# Patient Record
Sex: FEMALE | Race: WHITE | NOT HISPANIC OR LATINO | URBAN - METROPOLITAN AREA
[De-identification: names, ages, dates, MRNs, and addresses within clinical notes are randomized per-mention and may not be internally consistent; named-entity substitution may affect disease eponyms.]

---

## 2017-09-07 ENCOUNTER — EMERGENCY (EMERGENCY)
Facility: HOSPITAL | Age: 67
LOS: 1 days | Discharge: HOME | End: 2017-09-07
Attending: EMERGENCY MEDICINE | Admitting: EMERGENCY MEDICINE
Payer: OTHER GOVERNMENT

## 2017-09-07 VITALS
OXYGEN SATURATION: 99 % | RESPIRATION RATE: 18 BRPM | SYSTOLIC BLOOD PRESSURE: 160 MMHG | HEART RATE: 76 BPM | DIASTOLIC BLOOD PRESSURE: 82 MMHG

## 2017-09-07 VITALS
SYSTOLIC BLOOD PRESSURE: 145 MMHG | RESPIRATION RATE: 18 BRPM | HEART RATE: 74 BPM | OXYGEN SATURATION: 100 % | DIASTOLIC BLOOD PRESSURE: 85 MMHG | TEMPERATURE: 98 F

## 2017-09-07 LAB
ALBUMIN SERPL ELPH-MCNC: 4.6 G/DL — SIGNIFICANT CHANGE UP (ref 3.3–5)
ALBUMIN SERPL ELPH-MCNC: 4.6 G/DL — SIGNIFICANT CHANGE UP (ref 3.3–5)
ALP SERPL-CCNC: 141 U/L — HIGH (ref 40–120)
ALP SERPL-CCNC: 141 U/L — HIGH (ref 40–120)
ALT FLD-CCNC: 105 U/L — HIGH (ref 4–33)
ALT FLD-CCNC: 105 U/L — HIGH (ref 4–33)
APPEARANCE UR: CLEAR — SIGNIFICANT CHANGE UP
AST SERPL-CCNC: 137 U/L — HIGH (ref 4–32)
AST SERPL-CCNC: 137 U/L — HIGH (ref 4–32)
BASE EXCESS BLDV CALC-SCNC: 4 MMOL/L — SIGNIFICANT CHANGE UP
BASOPHILS # BLD AUTO: 0.05 K/UL — SIGNIFICANT CHANGE UP (ref 0–0.2)
BASOPHILS NFR BLD AUTO: 0.4 % — SIGNIFICANT CHANGE UP (ref 0–2)
BILIRUB DIRECT SERPL-MCNC: 0.3 MG/DL — HIGH (ref 0.1–0.2)
BILIRUB SERPL-MCNC: 0.5 MG/DL — SIGNIFICANT CHANGE UP (ref 0.2–1.2)
BILIRUB SERPL-MCNC: 0.5 MG/DL — SIGNIFICANT CHANGE UP (ref 0.2–1.2)
BILIRUB UR-MCNC: NEGATIVE — SIGNIFICANT CHANGE UP
BLOOD GAS VENOUS - CREATININE: 0.97 MG/DL — SIGNIFICANT CHANGE UP (ref 0.5–1.3)
BLOOD UR QL VISUAL: NEGATIVE — SIGNIFICANT CHANGE UP
BUN SERPL-MCNC: 19 MG/DL — SIGNIFICANT CHANGE UP (ref 7–23)
CALCIUM SERPL-MCNC: 11.2 MG/DL — HIGH (ref 8.4–10.5)
CHLORIDE BLDV-SCNC: 101 MMOL/L — SIGNIFICANT CHANGE UP (ref 96–108)
CHLORIDE SERPL-SCNC: 97 MMOL/L — LOW (ref 98–107)
CO2 SERPL-SCNC: 29 MMOL/L — SIGNIFICANT CHANGE UP (ref 22–31)
COLOR SPEC: YELLOW — SIGNIFICANT CHANGE UP
CREAT SERPL-MCNC: 1.11 MG/DL — SIGNIFICANT CHANGE UP (ref 0.5–1.3)
EOSINOPHIL # BLD AUTO: 0.01 K/UL — SIGNIFICANT CHANGE UP (ref 0–0.5)
EOSINOPHIL NFR BLD AUTO: 0.1 % — SIGNIFICANT CHANGE UP (ref 0–6)
GAS PNL BLDV: 140 MMOL/L — SIGNIFICANT CHANGE UP (ref 136–146)
GLUCOSE BLDV-MCNC: 134 — HIGH (ref 70–99)
GLUCOSE SERPL-MCNC: 133 MG/DL — HIGH (ref 70–99)
GLUCOSE UR-MCNC: NEGATIVE — SIGNIFICANT CHANGE UP
HCO3 BLDV-SCNC: 25 MMOL/L — SIGNIFICANT CHANGE UP (ref 20–27)
HCT VFR BLD CALC: 44.3 % — SIGNIFICANT CHANGE UP (ref 34.5–45)
HCT VFR BLDV CALC: 47.1 % — HIGH (ref 34.5–45)
HGB BLD-MCNC: 15 G/DL — SIGNIFICANT CHANGE UP (ref 11.5–15.5)
HGB BLDV-MCNC: 15.4 G/DL — SIGNIFICANT CHANGE UP (ref 11.5–15.5)
HYALINE CASTS # UR AUTO: SIGNIFICANT CHANGE UP (ref 0–?)
IMM GRANULOCYTES # BLD AUTO: 0.07 # — SIGNIFICANT CHANGE UP
IMM GRANULOCYTES NFR BLD AUTO: 0.6 % — SIGNIFICANT CHANGE UP (ref 0–1.5)
KETONES UR-MCNC: NEGATIVE — SIGNIFICANT CHANGE UP
LACTATE BLDV-MCNC: 3.4 MMOL/L — HIGH (ref 0.5–2)
LEUKOCYTE ESTERASE UR-ACNC: NEGATIVE — SIGNIFICANT CHANGE UP
LIDOCAIN IGE QN: 25.2 U/L — SIGNIFICANT CHANGE UP (ref 7–60)
LYMPHOCYTES # BLD AUTO: 0.87 K/UL — LOW (ref 1–3.3)
LYMPHOCYTES # BLD AUTO: 7.4 % — LOW (ref 13–44)
MCHC RBC-ENTMCNC: 32.1 PG — SIGNIFICANT CHANGE UP (ref 27–34)
MCHC RBC-ENTMCNC: 33.9 % — SIGNIFICANT CHANGE UP (ref 32–36)
MCV RBC AUTO: 94.7 FL — SIGNIFICANT CHANGE UP (ref 80–100)
MONOCYTES # BLD AUTO: 0.59 K/UL — SIGNIFICANT CHANGE UP (ref 0–0.9)
MONOCYTES NFR BLD AUTO: 5 % — SIGNIFICANT CHANGE UP (ref 2–14)
MUCOUS THREADS # UR AUTO: SIGNIFICANT CHANGE UP
NEUTROPHILS # BLD AUTO: 10.13 K/UL — HIGH (ref 1.8–7.4)
NEUTROPHILS NFR BLD AUTO: 86.5 % — HIGH (ref 43–77)
NITRITE UR-MCNC: NEGATIVE — SIGNIFICANT CHANGE UP
NRBC # FLD: 0 — SIGNIFICANT CHANGE UP
PCO2 BLDV: 54 MMHG — HIGH (ref 41–51)
PH BLDV: 7.36 PH — SIGNIFICANT CHANGE UP (ref 7.32–7.43)
PH UR: 7 — SIGNIFICANT CHANGE UP (ref 4.6–8)
PLATELET # BLD AUTO: 184 K/UL — SIGNIFICANT CHANGE UP (ref 150–400)
PMV BLD: 11.9 FL — SIGNIFICANT CHANGE UP (ref 7–13)
PO2 BLDV: < 24 MMHG — LOW (ref 35–40)
POTASSIUM BLDV-SCNC: 3.6 MMOL/L — SIGNIFICANT CHANGE UP (ref 3.4–4.5)
POTASSIUM SERPL-MCNC: 3.8 MMOL/L — SIGNIFICANT CHANGE UP (ref 3.5–5.3)
POTASSIUM SERPL-SCNC: 3.8 MMOL/L — SIGNIFICANT CHANGE UP (ref 3.5–5.3)
PROT SERPL-MCNC: 8.5 G/DL — HIGH (ref 6–8.3)
PROT SERPL-MCNC: 8.5 G/DL — HIGH (ref 6–8.3)
PROT UR-MCNC: 10 — SIGNIFICANT CHANGE UP
RBC # BLD: 4.68 M/UL — SIGNIFICANT CHANGE UP (ref 3.8–5.2)
RBC # FLD: 14.4 % — SIGNIFICANT CHANGE UP (ref 10.3–14.5)
RBC CASTS # UR COMP ASSIST: SIGNIFICANT CHANGE UP (ref 0–?)
SAO2 % BLDV: 23.7 % — LOW (ref 60–85)
SODIUM SERPL-SCNC: 142 MMOL/L — SIGNIFICANT CHANGE UP (ref 135–145)
SP GR SPEC: 1.02 — SIGNIFICANT CHANGE UP (ref 1–1.03)
SQUAMOUS # UR AUTO: SIGNIFICANT CHANGE UP
UROBILINOGEN FLD QL: NORMAL E.U. — SIGNIFICANT CHANGE UP (ref 0.1–0.2)
WBC # BLD: 11.72 K/UL — HIGH (ref 3.8–10.5)
WBC # FLD AUTO: 11.72 K/UL — HIGH (ref 3.8–10.5)
WBC UR QL: SIGNIFICANT CHANGE UP (ref 0–?)

## 2017-09-07 PROCEDURE — 99284 EMERGENCY DEPT VISIT MOD MDM: CPT | Mod: GC

## 2017-09-07 RX ORDER — SODIUM CHLORIDE 9 MG/ML
1000 INJECTION INTRAMUSCULAR; INTRAVENOUS; SUBCUTANEOUS ONCE
Qty: 0 | Refills: 0 | Status: COMPLETED | OUTPATIENT
Start: 2017-09-07 | End: 2017-09-07

## 2017-09-07 RX ORDER — ONDANSETRON 8 MG/1
4 TABLET, FILM COATED ORAL ONCE
Qty: 0 | Refills: 0 | Status: COMPLETED | OUTPATIENT
Start: 2017-09-07 | End: 2017-09-07

## 2017-09-07 RX ORDER — ACETAMINOPHEN 500 MG
1000 TABLET ORAL ONCE
Qty: 0 | Refills: 0 | Status: COMPLETED | OUTPATIENT
Start: 2017-09-07 | End: 2017-09-07

## 2017-09-07 RX ADMIN — ONDANSETRON 4 MILLIGRAM(S): 8 TABLET, FILM COATED ORAL at 13:02

## 2017-09-07 RX ADMIN — SODIUM CHLORIDE 1000 MILLILITER(S): 9 INJECTION INTRAMUSCULAR; INTRAVENOUS; SUBCUTANEOUS at 13:02

## 2017-09-07 RX ADMIN — Medication 1000 MILLIGRAM(S): at 13:15

## 2017-09-07 RX ADMIN — Medication 400 MILLIGRAM(S): at 13:03

## 2017-09-07 NOTE — ED ADULT TRIAGE NOTE - CHIEF COMPLAINT QUOTE
c/o of transverse abdominal pain with nausea. Hx known gallstones.  Denies vomiting/diarrhea.  Denies fever/chills.  Denies chest pain/SOB/lightheadedness.

## 2017-09-07 NOTE — ED PROVIDER NOTE - ATTENDING CONTRIBUTION TO CARE
I performed a face-to-face evaluation of the patient and performed a history and physical examination. I agree with the history and physical examination.    Marlin: 66 F, HTN, HL, known cholelithasis, chronic transaminitis 2/2 ETOH use, p/w upper abd pain that awoke her 2 hrs PTA. W/ nausea. Decreased pain now. Appears well. NAD. Afebrile. Vital signs unremarkable. Appears well. NAD. Afebrile. Vital signs unremarkable. Abd NT/ND. No jaundice. Likely biliary colic. PlanL lipase, LFTs. If unremarkable, Discharge home.

## 2017-09-07 NOTE — ED PROVIDER NOTE - OBJECTIVE STATEMENT
65 y/o F with h/o HTN, hypothyroid presents with complaint of upper abdominal pain. Patient awoke 2 hours ago with 8/10 upper abdominal pain and nausea. No vomiting. Pain now subsided and described as 3/10 associated with mild nausea. Patient had similar episode 3 months ago and was found to have gallstones. Is being followed by Dr. Sargent (Surgeon). Patient reports daily alcohol use-2 glasses of bourbon. No history of alcohol withdrawal.

## 2017-09-07 NOTE — ED PROVIDER NOTE - PROGRESS NOTE DETAILS
There is mild elevation of AST/ ALT consistent with patient's chronic alcohol use. Patient's abdominal exam is benign. No complaint of pain at this time. Tolerating PO.

## 2017-09-07 NOTE — ED ADULT NURSE NOTE - OBJECTIVE STATEMENT
pt c/o upper quad abd pain started this AM. Hx gallstones. changed into gown, obtained urine, iv start 20g left ac with bloods drawn. NPO since last night.

## 2017-09-07 NOTE — ED PROVIDER NOTE - MEDICAL DECISION MAKING DETAILS
Marlin: 66 F, HTN, HL, known cholelithasis, p/w upper abd pain that awoke her 2 hrs PTA. W/ nausea. Decreased pain now. Appears well. NAD. Afebrile. Vital signs unremarkable. Appears well. NAD. Afebrile. Vital signs unremarkable. Abd NT/ND. No jaundice. Likely biliary colic. PlanL lipase, LFTs. If neg, Discharge home.

## 2017-09-08 LAB
BACTERIA UR CULT: SIGNIFICANT CHANGE UP
SPECIMEN SOURCE: SIGNIFICANT CHANGE UP

## 2017-09-09 ENCOUNTER — INPATIENT (INPATIENT)
Facility: HOSPITAL | Age: 67
LOS: 0 days | Discharge: HOME | End: 2017-09-10
Attending: SURGERY | Admitting: SURGERY
Payer: OTHER GOVERNMENT

## 2017-09-09 ENCOUNTER — RESULT REVIEW (OUTPATIENT)
Age: 67
End: 2017-09-09

## 2017-09-09 VITALS
HEART RATE: 88 BPM | RESPIRATION RATE: 16 BRPM | OXYGEN SATURATION: 96 % | DIASTOLIC BLOOD PRESSURE: 76 MMHG | SYSTOLIC BLOOD PRESSURE: 135 MMHG | HEIGHT: 64 IN | TEMPERATURE: 98 F | WEIGHT: 177.03 LBS

## 2017-09-09 DIAGNOSIS — Z98.890 OTHER SPECIFIED POSTPROCEDURAL STATES: Chronic | ICD-10-CM

## 2017-09-09 DIAGNOSIS — K81.9 CHOLECYSTITIS, UNSPECIFIED: ICD-10-CM

## 2017-09-09 LAB
BLD GP AB SCN SERPL QL: NEGATIVE — SIGNIFICANT CHANGE UP
RH IG SCN BLD-IMP: POSITIVE — SIGNIFICANT CHANGE UP

## 2017-09-09 PROCEDURE — 76705 ECHO EXAM OF ABDOMEN: CPT | Mod: 26

## 2017-09-09 PROCEDURE — 88304 TISSUE EXAM BY PATHOLOGIST: CPT | Mod: 26

## 2017-09-09 PROCEDURE — 99223 1ST HOSP IP/OBS HIGH 75: CPT | Mod: 25,AI,GC

## 2017-09-09 PROCEDURE — 47562 LAPAROSCOPIC CHOLECYSTECTOMY: CPT | Mod: GC

## 2017-09-09 RX ORDER — PIPERACILLIN AND TAZOBACTAM 4; .5 G/20ML; G/20ML
3.38 INJECTION, POWDER, LYOPHILIZED, FOR SOLUTION INTRAVENOUS EVERY 8 HOURS
Qty: 0 | Refills: 0 | Status: DISCONTINUED | OUTPATIENT
Start: 2017-09-09 | End: 2017-09-10

## 2017-09-09 RX ORDER — ACETAMINOPHEN 500 MG
1000 TABLET ORAL ONCE
Qty: 0 | Refills: 0 | Status: COMPLETED | OUTPATIENT
Start: 2017-09-09 | End: 2017-09-09

## 2017-09-09 RX ORDER — LEVOTHYROXINE SODIUM 125 MCG
75 TABLET ORAL DAILY
Qty: 0 | Refills: 0 | Status: DISCONTINUED | OUTPATIENT
Start: 2017-09-09 | End: 2017-09-10

## 2017-09-09 RX ORDER — PIPERACILLIN AND TAZOBACTAM 4; .5 G/20ML; G/20ML
3.38 INJECTION, POWDER, LYOPHILIZED, FOR SOLUTION INTRAVENOUS ONCE
Qty: 0 | Refills: 0 | Status: COMPLETED | OUTPATIENT
Start: 2017-09-09 | End: 2017-09-09

## 2017-09-09 RX ORDER — HEPARIN SODIUM 5000 [USP'U]/ML
5000 INJECTION INTRAVENOUS; SUBCUTANEOUS EVERY 8 HOURS
Qty: 0 | Refills: 0 | Status: DISCONTINUED | OUTPATIENT
Start: 2017-09-09 | End: 2017-09-10

## 2017-09-09 RX ORDER — SODIUM CHLORIDE 9 MG/ML
1000 INJECTION, SOLUTION INTRAVENOUS
Qty: 0 | Refills: 0 | Status: DISCONTINUED | OUTPATIENT
Start: 2017-09-09 | End: 2017-09-10

## 2017-09-09 RX ADMIN — PIPERACILLIN AND TAZOBACTAM 200 GRAM(S): 4; .5 INJECTION, POWDER, LYOPHILIZED, FOR SOLUTION INTRAVENOUS at 14:29

## 2017-09-09 RX ADMIN — SODIUM CHLORIDE 100 MILLILITER(S): 9 INJECTION, SOLUTION INTRAVENOUS at 23:23

## 2017-09-09 RX ADMIN — SODIUM CHLORIDE 100 MILLILITER(S): 9 INJECTION, SOLUTION INTRAVENOUS at 21:12

## 2017-09-09 RX ADMIN — Medication 400 MILLIGRAM(S): at 17:45

## 2017-09-09 RX ADMIN — PIPERACILLIN AND TAZOBACTAM 25 GRAM(S): 4; .5 INJECTION, POWDER, LYOPHILIZED, FOR SOLUTION INTRAVENOUS at 23:22

## 2017-09-09 RX ADMIN — Medication 1000 MILLIGRAM(S): at 17:50

## 2017-09-09 RX ADMIN — SODIUM CHLORIDE 100 MILLILITER(S): 9 INJECTION, SOLUTION INTRAVENOUS at 17:40

## 2017-09-09 RX ADMIN — HEPARIN SODIUM 5000 UNIT(S): 5000 INJECTION INTRAVENOUS; SUBCUTANEOUS at 23:21

## 2017-09-09 NOTE — H&P ADULT - PMH
Atrial septal defect  s/p repair 2010  Hypertension    Hypothyroid    TIA (transient ischemic attack)  January 2017

## 2017-09-09 NOTE — H&P ADULT - NSHPLABSRESULTS_GEN_ALL_CORE
CBC (09-09 @ 07:05)                              15.2                           22.90<H>  )----------------(  171        89.5<H>% Neutrophils, 3.6<L>% Lymphocytes, ANC: 20.52<H>                              44.5      BMP (09-09 @ 07:05)             134<L>  |  92<L>   |  17    		Ca++ --      Ca 10.2               ---------------------------------( 155<H>		Mg --                 4.8     |  23      |  0.97  			Ph --        LFTs (09-09 @ 07:05)      TPro 8.7<H> / Alb 4.2 / TBili 1.0 / DBili -- / AST 85<H> / <H> / AlkPhos 128<H>      VBG (09-09 @ 10:35)     7.44<H> / 37<L> / 61<H> / 25 / 0.8 / 90.4<H>%     Lactate: 1.5      < from: US Abdomen Limited (09.09.17 @ 09:45) >    IMPRESSION:     Cholelithiasis with focal areas of gallbladder wall thickening with an   unreliable sonographic Jo's sign. If clinical suspicion persists for   cholecystitis, HIDA scan can be obtained.      < end of copied text >

## 2017-09-09 NOTE — H&P ADULT - ATTENDING COMMENTS
Pt seen and examined.  Agree with resident eval and plan.  OR for laparoscopic cholecystectomy possible open.

## 2017-09-09 NOTE — H&P ADULT - ASSESSMENT
ASSESSMENT: Patient is a 66y old f with symptomatic cholelithiasis, acute cholecystitis.    PLAN:   - admit to Surgery, Dr. Smith  - NPO  - IV fluids  - IV antibiotics  - Patient consented and added on for OR today for lap carl  - Patient and plan discussed with Attending, Dr. Luis Harry MD PGY3  B Team Surgery, #[id number]

## 2017-09-09 NOTE — ED PROVIDER NOTE - ATTENDING CONTRIBUTION TO CARE
I , Cammy Oliver M.D. have examined the patient and confirmed the essential components of the history, physical examination, diagnosis, and treatment plan. I agree with the patient's care as documented by the PA and amended herein by me. See note above for complete details of service.    67 yo F multiple med Hx inc. ASD s/p repair, TIA, HTN, Hypothyroidism, recently seen on 9/7 for abdominal pain who returns with continued epigastric pain associated with nausea and vomiting. Exam reveals RUQ ttp. US + cholelithiasis with focal areas of GBW thickening. Surg cs placed; accepted pt for admission for surgical mgmt.

## 2017-09-09 NOTE — H&P ADULT - NSHPPHYSICALEXAM_GEN_ALL_CORE
General: NAD, Lying in bed comfortably  Neuro: A+Ox3, no focal deficits  Cardio: RRR, nml S1/S2  Resp: Good effort, CTA b/l  GI/Abd: Soft, mildly distended, tender in RUQ with radiation to LUQ, no rebound/guarding, no masses palpated   Vascular: All 4 extremities warm  Skin: Intact, no breakdown  Musculoskeletal: All 4 extremities moving spontaneously, no limitations.

## 2017-09-09 NOTE — BRIEF OPERATIVE NOTE - OPERATION/FINDINGS
Gangrenous gallbladder, not perforated.  Taken from top down approach. ligated with endo-loops x2.  CHARY drain left in fossa

## 2017-09-09 NOTE — H&P ADULT - NSHPSOCIALHISTORY_GEN_ALL_CORE
Denies current tobacco use, former social smoker.  Current daily alcohol use, about 2 drinks per night, no prior episodes of alcohol withdrawal.

## 2017-09-09 NOTE — H&P ADULT - HISTORY OF PRESENT ILLNESS
Patient is a 66y old  Female who presents with a chief complaint of abdominal pain, nausea, and emesis for the past 4-5 days.  Patient has a history of cholelithiasis, and had one episode of biliary colic in June.  She was found to have gallstones, but never was referred for surgical intervention.  About 4-5 days ago, she started having worsening abdominal pain, with decreased appetite.  She presented to the ED on 9/7, but after IV hydration felt improved and was tolerating PO, and was discharged home.  At that time workup included labs, significant for leukocytosis.  No further workup was performed.  Yesterday, she had worsening of abdominal pain, with nausea and 2 episodes of emesis.  She felt febrile, and had temperatures of up to 101 at home.  She has not had a bowel movement in several days.      PMH significant for h/o ASD and a h/o TIA, she is on ASA 81 daily.  Home medications are: Atorvastatin 20, ASA 81, HCTZ/Triemterene 75/50 0.5 tabs qd, Levothyroxine 75 qd, and Diltiazem 240 qd.

## 2017-09-09 NOTE — ED PROVIDER NOTE - MEDICAL DECISION MAKING DETAILS
pt c/ hx cholelithiasis pw upper abdominal pain and vomiting. symptom control, labs, UA. US ro biliary karissa.

## 2017-09-10 ENCOUNTER — TRANSCRIPTION ENCOUNTER (OUTPATIENT)
Age: 67
End: 2017-09-10

## 2017-09-10 VITALS
SYSTOLIC BLOOD PRESSURE: 99 MMHG | OXYGEN SATURATION: 99 % | HEART RATE: 62 BPM | TEMPERATURE: 98 F | DIASTOLIC BLOOD PRESSURE: 69 MMHG | RESPIRATION RATE: 18 BRPM

## 2017-09-10 LAB
ALBUMIN SERPL ELPH-MCNC: 3.4 G/DL — SIGNIFICANT CHANGE UP (ref 3.3–5)
ALP SERPL-CCNC: 97 U/L — SIGNIFICANT CHANGE UP (ref 40–120)
ALT FLD-CCNC: 88 U/L — HIGH (ref 4–33)
AST SERPL-CCNC: 47 U/L — HIGH (ref 4–32)
BILIRUB DIRECT SERPL-MCNC: 0.3 MG/DL — HIGH (ref 0.1–0.2)
BILIRUB SERPL-MCNC: 0.8 MG/DL — SIGNIFICANT CHANGE UP (ref 0.2–1.2)
BUN SERPL-MCNC: 15 MG/DL — SIGNIFICANT CHANGE UP (ref 7–23)
CALCIUM SERPL-MCNC: 8.8 MG/DL — SIGNIFICANT CHANGE UP (ref 8.4–10.5)
CHLORIDE SERPL-SCNC: 98 MMOL/L — SIGNIFICANT CHANGE UP (ref 98–107)
CO2 SERPL-SCNC: 24 MMOL/L — SIGNIFICANT CHANGE UP (ref 22–31)
CREAT SERPL-MCNC: 1.08 MG/DL — SIGNIFICANT CHANGE UP (ref 0.5–1.3)
GLUCOSE SERPL-MCNC: 115 MG/DL — HIGH (ref 70–99)
HCT VFR BLD CALC: 37 % — SIGNIFICANT CHANGE UP (ref 34.5–45)
HGB BLD-MCNC: 12.4 G/DL — SIGNIFICANT CHANGE UP (ref 11.5–15.5)
MAGNESIUM SERPL-MCNC: 1.7 MG/DL — SIGNIFICANT CHANGE UP (ref 1.6–2.6)
MCHC RBC-ENTMCNC: 31.6 PG — SIGNIFICANT CHANGE UP (ref 27–34)
MCHC RBC-ENTMCNC: 33.5 % — SIGNIFICANT CHANGE UP (ref 32–36)
MCV RBC AUTO: 94.4 FL — SIGNIFICANT CHANGE UP (ref 80–100)
NRBC # FLD: 0 — SIGNIFICANT CHANGE UP
PHOSPHATE SERPL-MCNC: 2.7 MG/DL — SIGNIFICANT CHANGE UP (ref 2.5–4.5)
PLATELET # BLD AUTO: 147 K/UL — LOW (ref 150–400)
PMV BLD: 12.7 FL — SIGNIFICANT CHANGE UP (ref 7–13)
POTASSIUM SERPL-MCNC: 3.9 MMOL/L — SIGNIFICANT CHANGE UP (ref 3.5–5.3)
POTASSIUM SERPL-SCNC: 3.9 MMOL/L — SIGNIFICANT CHANGE UP (ref 3.5–5.3)
PROT SERPL-MCNC: 6.9 G/DL — SIGNIFICANT CHANGE UP (ref 6–8.3)
RBC # BLD: 3.92 M/UL — SIGNIFICANT CHANGE UP (ref 3.8–5.2)
RBC # FLD: 14.5 % — SIGNIFICANT CHANGE UP (ref 10.3–14.5)
SODIUM SERPL-SCNC: 137 MMOL/L — SIGNIFICANT CHANGE UP (ref 135–145)
WBC # BLD: 19.2 K/UL — HIGH (ref 3.8–10.5)
WBC # FLD AUTO: 19.2 K/UL — HIGH (ref 3.8–10.5)

## 2017-09-10 RX ORDER — LEVOTHYROXINE SODIUM 125 MCG
1 TABLET ORAL
Qty: 0 | Refills: 0 | COMMUNITY
Start: 2017-09-10

## 2017-09-10 RX ORDER — SODIUM CHLORIDE 9 MG/ML
3 INJECTION INTRAMUSCULAR; INTRAVENOUS; SUBCUTANEOUS EVERY 8 HOURS
Qty: 0 | Refills: 0 | Status: DISCONTINUED | OUTPATIENT
Start: 2017-09-10 | End: 2017-09-10

## 2017-09-10 RX ORDER — ACETAMINOPHEN 500 MG
650 TABLET ORAL EVERY 6 HOURS
Qty: 0 | Refills: 0 | Status: DISCONTINUED | OUTPATIENT
Start: 2017-09-10 | End: 2017-09-10

## 2017-09-10 RX ORDER — DOCUSATE SODIUM 100 MG
1 CAPSULE ORAL
Qty: 0 | Refills: 0 | COMMUNITY

## 2017-09-10 RX ORDER — OXYCODONE AND ACETAMINOPHEN 5; 325 MG/1; MG/1
2 TABLET ORAL EVERY 4 HOURS
Qty: 0 | Refills: 0 | Status: DISCONTINUED | OUTPATIENT
Start: 2017-09-10 | End: 2017-09-10

## 2017-09-10 RX ORDER — DILTIAZEM HCL 120 MG
1 CAPSULE, EXT RELEASE 24 HR ORAL
Qty: 0 | Refills: 0 | COMMUNITY
Start: 2017-09-10

## 2017-09-10 RX ORDER — OXYCODONE AND ACETAMINOPHEN 5; 325 MG/1; MG/1
1 TABLET ORAL EVERY 4 HOURS
Qty: 0 | Refills: 0 | Status: DISCONTINUED | OUTPATIENT
Start: 2017-09-10 | End: 2017-09-10

## 2017-09-10 RX ORDER — ACETAMINOPHEN 500 MG
2 TABLET ORAL
Qty: 0 | Refills: 0 | COMMUNITY
Start: 2017-09-10

## 2017-09-10 RX ADMIN — PIPERACILLIN AND TAZOBACTAM 25 GRAM(S): 4; .5 INJECTION, POWDER, LYOPHILIZED, FOR SOLUTION INTRAVENOUS at 05:55

## 2017-09-10 RX ADMIN — SODIUM CHLORIDE 3 MILLILITER(S): 9 INJECTION INTRAMUSCULAR; INTRAVENOUS; SUBCUTANEOUS at 13:07

## 2017-09-10 RX ADMIN — HEPARIN SODIUM 5000 UNIT(S): 5000 INJECTION INTRAVENOUS; SUBCUTANEOUS at 05:55

## 2017-09-10 RX ADMIN — HEPARIN SODIUM 5000 UNIT(S): 5000 INJECTION INTRAVENOUS; SUBCUTANEOUS at 15:15

## 2017-09-10 RX ADMIN — OXYCODONE AND ACETAMINOPHEN 1 TABLET(S): 5; 325 TABLET ORAL at 13:05

## 2017-09-10 RX ADMIN — Medication 75 MICROGRAM(S): at 05:56

## 2017-09-10 RX ADMIN — SODIUM CHLORIDE 100 MILLILITER(S): 9 INJECTION, SOLUTION INTRAVENOUS at 11:05

## 2017-09-10 RX ADMIN — Medication 1 TABLET(S): at 15:15

## 2017-09-10 RX ADMIN — OXYCODONE AND ACETAMINOPHEN 1 TABLET(S): 5; 325 TABLET ORAL at 13:30

## 2017-09-10 NOTE — PROVIDER CONTACT NOTE (OTHER) - SITUATION
Patient is s/p lap carl on 9/9. During change of shift RN was told that patient has not voided since OR and as of yet patient has not voided.

## 2017-09-10 NOTE — DISCHARGE NOTE ADULT - CARE PLAN
Principal Discharge DX:	Cholecystitis  Goal:	continued recovery from surgery  Instructions for follow-up, activity and diet:	The patient may resume a regular diet.  Resume regular activities, refrain from lifting more than 45 lbs for 4-6 weeks.    Please follow up with Dr. Smith within 1-2 weeks after discharge from the hospital. You may call [phone] to schedule an appointment.    Follow-up with your primary care doctor as well.

## 2017-09-10 NOTE — DISCHARGE NOTE ADULT - PLAN OF CARE
continued recovery from surgery The patient may resume a regular diet.  Resume regular activities, refrain from lifting more than 45 lbs for 4-6 weeks.    Please follow up with Dr. Smith within 1-2 weeks after discharge from the hospital. You may call [phone] to schedule an appointment.    Follow-up with your primary care doctor as well.

## 2017-09-10 NOTE — PROGRESS NOTE ADULT - SUBJECTIVE AND OBJECTIVE BOX
S: pt seen and examined this morning on rounds. Doing well, pain is controlled with oral medications, and she is tolerating a regular diet.  O: ICU Vital Signs Last 24 Hrs  T(C): 36.8 (10 Sep 2017 10:13), Max: 37.7 (09 Sep 2017 21:24)  T(F): 98.3 (10 Sep 2017 10:13), Max: 99.8 (09 Sep 2017 21:24)  HR: 67 (10 Sep 2017 10:13) (59 - 88)  BP: 96/60 (10 Sep 2017 10:13) (92/100 - 135/76)  BP(mean): --  ABP: --  ABP(mean): --  RR: 17 (10 Sep 2017 10:13) (16 - 18)  SpO2: 96% (10 Sep 2017 10:13) (96% - 100%)    MEDICATIONS  (STANDING):  heparin  Injectable 5000 Unit(s) SubCutaneous every 8 hours  levothyroxine 75 MICROGram(s) Oral daily  diltiazem    milliGRAM(s) Oral daily  amoxicillin  500 milliGRAM(s)/clavulanate 1 Tablet(s) Oral every 8 hours  sodium chloride 0.9% lock flush 3 milliLiter(s) IV Push every 8 hours    MEDICATIONS  (PRN):  oxyCODONE    5 mG/acetaminophen 325 mG 1 Tablet(s) Oral every 4 hours PRN Moderate Pain (4 - 6)  oxyCODONE    5 mG/acetaminophen 325 mG 2 Tablet(s) Oral every 4 hours PRN Severe Pain (7 - 10)  acetaminophen   Tablet. 650 milliGRAM(s) Oral every 6 hours PRN Mild Pain (1 - 3)    LAB:.  LABS:                         12.4   19.20 )-----------( 147      ( 10 Sep 2017 06:00 )             37.0     09-10    137  |  98  |  15  ----------------------------<  115<H>  3.9   |  24  |  1.08    Ca    8.8      10 Sep 2017 06:00  Phos  2.7     09-10  Mg     1.7     09-10    TPro  6.9  /  Alb  3.4  /  TBili  0.8  /  DBili  0.3<H>  /  AST  47<H>  /  ALT  88<H>  /  AlkPhos  97  09-10              RADIOLOGY, EKG & ADDITIONAL TESTS: Reviewed.

## 2017-09-10 NOTE — DISCHARGE NOTE ADULT - CARE PROVIDER_API CALL
Justen Smith (MD), Surgery  [address] [address] [place] [address]  [place] [address]  Phone: [phone]  Fax: [phone]

## 2017-09-10 NOTE — PROVIDER CONTACT NOTE (OTHER) - ACTION/TREATMENT ORDERED:
Patient requested to have her sometime because she feels the urge to and has not drank much liquid during the evening. Will continue to monitor.

## 2017-09-10 NOTE — DISCHARGE NOTE ADULT - HOSPITAL COURSE
Prior to admission: Patient is a 66y old  Female who presents with a chief complaint of abdominal pain, nausea, and emesis for the past 4-5 days.  Patient has a history of cholelithiasis, and had one episode of biliary colic in June.  She was found to have gallstones, but never was referred for surgical intervention.  About 4-5 days ago, she started having worsening abdominal pain, with decreased appetite.  She presented to the ED on 9/7, but after IV hydration felt improved and was tolerating PO, and was discharged home.  At that time workup included labs, significant for leukocytosis.  No further workup was performed.  Yesterday, she had worsening of abdominal pain, with nausea and 2 episodes of emesis.  She felt febrile, and had temperatures of up to 101 at home.  She has not had a bowel movement in several days.      PMH significant for h/o ASD and a h/o TIA, she is on ASA 81 daily.  Home medications are: Atorvastatin 20, ASA 81, HCTZ/Triemterene 75/50 0.5 tabs qd, Levothyroxine 75 qd, and Diltiazem 240 qd.      She was found to have cholecystitis and was taken for a laparoscopic cholecystectomy. On POD#0 she tolerated a regular diet, and pain was controlled with oral medications. She continued to do well and on POD#0 she was transitioned to oral antibiotics, and provided with CHARY drain teaching. At this point the team and the patient felt comfortable with a discharge to home with follow up with Dr. Smith scheduled for Tuesday September 19th

## 2017-09-10 NOTE — PROGRESS NOTE ADULT - SUBJECTIVE AND OBJECTIVE BOX
STATUS POST:  Laparoscopic cholecystectomy    SUBJECTIVE: Pt seen and examined at bedside. Doing well.     SOB:  [ ] YES [x ] NO  Chest Discomfort: [ ] YES [x ] NO    Nausea: [ ] YES [x ] NO           Vomiting: [ ] YES [ x] NO  Flatus: [ ] YES [x ] NO             Bowel Movement: [ ] YES [x ] NO  Diarrhea: [ ] YES [ x] NO              Pain Control Adequate: [x ] YES [ ] NO    Vital Signs Last 24 Hrs  T(C): 37.7 (09 Sep 2017 21:24), Max: 37.7 (09 Sep 2017 21:24)  T(F): 99.8 (09 Sep 2017 21:24), Max: 99.8 (09 Sep 2017 21:24)  HR: 60 (09 Sep 2017 22:27) (60 - 88)  BP: 100/60 (09 Sep 2017 22:27) (98/62 - 135/76)  BP(mean): --  RR: 17 (09 Sep 2017 21:24) (16 - 17)  SpO2: 98% (09 Sep 2017 21:24) (96% - 100%)  I&O's Summary    09 Sep 2017 07:01  -  10 Sep 2017 00:20  --------------------------------------------------------  IN: 550 mL / OUT: 40 mL / NET: 510 mL      I&O's Detail    09 Sep 2017 07:01  -  10 Sep 2017 00:20  --------------------------------------------------------  IN:    lactated ringers.: 350 mL    Oral Fluid: 200 mL  Total IN: 550 mL    OUT:    Bulb: 40 mL  Total OUT: 40 mL    Total NET: 510 mL          MEDICATIONS  (STANDING):  heparin  Injectable 5000 Unit(s) SubCutaneous every 8 hours  lactated ringers. 1000 milliLiter(s) (100 mL/Hr) IV Continuous <Continuous>  piperacillin/tazobactam IVPB. 3.375 Gram(s) IV Intermittent every 8 hours  levothyroxine 75 MICROGram(s) Oral daily  diltiazem    milliGRAM(s) Oral daily    MEDICATIONS  (PRN):      LABS:                        15.2   22.90 )-----------( 171      ( 09 Sep 2017 07:05 )             44.5     09-09    134<L>  |  92<L>  |  17  ----------------------------<  155<H>  4.8   |  23  |  0.97    Ca    10.2      09 Sep 2017 07:05    TPro  8.7<H>  /  Alb  4.2  /  TBili  1.0  /  DBili  x   /  AST  85<H>  /  ALT  134<H>  /  AlkPhos  128<H>  09-09          RADIOLOGY & ADDITIONAL STUDIES:    Physical Exam:    General: WN/WD NAD  Neurology: A&Ox3, nonfocal, follows commands  Eyes: PERRLA/ EOMI  ENT/Neck: Neck supple, trachea midline, No JVD  Abdominal: Soft, NT, ND +BS,   Incisions: Laparoscopic port sites w/ dressings in place c/d/i. No evidence of hematoma or active bleeding. No surrounding erythema      A/P: 66y Female s/p  Laparoscopic cholecystectomy. Hemodynamically stable and doing well.    - Pain control  - Diet: Regular   - Activity: Ambulate as tolerated  - Labs: Am labs  - DVT ppx: SQH  - Monitor GI and  function  - Dispo: Floor    Ender Lopez M.D.

## 2017-09-10 NOTE — DISCHARGE NOTE ADULT - MEDICATION SUMMARY - MEDICATIONS TO TAKE
I will START or STAY ON the medications listed below when I get home from the hospital:    acetaminophen 325 mg oral tablet  -- 2 tab(s) by mouth every 6 hours, As needed, Mild Pain (1 - 3)  -- Indication: For pain    oxyCODONE-acetaminophen 5 mg-325 mg oral tablet  -- 1 tab(s) by mouth every 6 hours, As Needed -Moderate Pain (4 - 6) and severe pain MDD:4 tabs   -- Indication: For pain    dilTIAZem 240 mg/24 hours oral capsule, extended release  -- 1 cap(s) by mouth once a day  -- Indication: For home hypertension med    Dulcolax Stool Softener 100 mg oral capsule  -- 1 cap(s) by mouth 2 times a day, As Needed for constipation  -- Indication: For Constipation    amoxicillin-clavulanate 500 mg-125 mg oral tablet  -- 1 tab(s) by mouth every 8 hours  -- Indication: For infection ppx    levothyroxine 75 mcg (0.075 mg) oral tablet  -- 1 tab(s) by mouth once a day  -- Indication: For home hypothyroid medication

## 2017-09-10 NOTE — PROGRESS NOTE ADULT - ATTENDING COMMENTS
Pt seen and examined.  Agree with note.  CHARY serosanguinous.  Regular Diet.  CHARY care teaching.  Ant D/c later today.

## 2017-09-10 NOTE — DISCHARGE NOTE ADULT - PATIENT PORTAL LINK FT
“You can access the [facility] Patient Portal, offered by [facility], by registering with the following website: [email or web address]”

## 2017-09-10 NOTE — PROGRESS NOTE ADULT - ASSESSMENT
66F with acute cholecystitis s/p laparoscopic cholecystecomy POD#1, doing well. WBC decreasing, afebrile, pain controlled, tolerating regular diet.    -ready for discharge to home with po pain medications and with augmentinx5 days, will follow up with Dr. Smith on Tuesday September 19th.

## 2017-09-10 NOTE — PROVIDER CONTACT NOTE (OTHER) - ASSESSMENT
Patient axox4. VSS. Abdomen softly distended. Patient continues with ivf at 100cc/hr. Bladder is not distended. RLQ CHARY in place draining serosanguineous drainage.

## 2017-09-12 ENCOUNTER — TRANSCRIPTION ENCOUNTER (OUTPATIENT)
Age: 67
End: 2017-09-12

## 2017-09-12 PROBLEM — Z00.00 ENCOUNTER FOR PREVENTIVE HEALTH EXAMINATION: Status: ACTIVE | Noted: 2017-09-12

## 2017-09-18 LAB — SURGICAL PATHOLOGY STUDY: SIGNIFICANT CHANGE UP

## 2017-09-19 ENCOUNTER — APPOINTMENT (OUTPATIENT)
Dept: SURGERY | Facility: CLINIC | Age: 67
End: 2017-09-19
Payer: OTHER GOVERNMENT

## 2017-09-19 VITALS
TEMPERATURE: 98 F | BODY MASS INDEX: 29.88 KG/M2 | OXYGEN SATURATION: 96 % | HEIGHT: 64 IN | DIASTOLIC BLOOD PRESSURE: 85 MMHG | RESPIRATION RATE: 17 BRPM | HEART RATE: 76 BPM | SYSTOLIC BLOOD PRESSURE: 127 MMHG | WEIGHT: 175 LBS

## 2017-09-19 PROCEDURE — 99024 POSTOP FOLLOW-UP VISIT: CPT

## 2019-08-23 ENCOUNTER — TRANSCRIPTION ENCOUNTER (OUTPATIENT)
Age: 69
End: 2019-08-23

## 2019-12-17 NOTE — PRE-OP CHECKLIST - SKIN PREP
"12/17/19 @ 1:30 pm  Received call from patient, Sammi Cordero she called requesting recent results of Lymph Node biopsy performed mid Nov 2019. She is getting anxious regarding the length of time it is taking to get the results. Reviewed patients chart, informed her that we still do not have any results regarding her bx. Informed her that I will contact the  Pathology Dept regarding this matter.    12/17/19 @ 1:45 pm  Spoke to  Pathology Lab, spoke with Randall she reviewed patients case and informed me that they have not received results from [facility] \"Lymph Node Biopsy\" She was going to call [facility] to question length of time for the results.     12/17/19 @ 2:15 pm  Returned call back to patient, informed her that we still do not have these results. Informed her that I was very sorry.  She is very anxious and fearful that the specimen has been lost. Informed her that as soon as we get the results will have Dr Macario review and contact her. She was reluctant but v/u and will wait for our call back.   " n/a

## 2020-03-21 NOTE — ED ADULT NURSE NOTE - BREATHING, MLM
VN cued into room for q2h rounding.  Pt resting comfortably in bed.  No needs or complaints at this time.  VN will continue to follow and be available as needed.     Spontaneous, unlabored and symmetrical

## 2020-10-07 NOTE — ED PROVIDER NOTE - RESPIRATORY, MLM
Medical Oncology Follow-Up    Subjective   HISTORY OF PRESENT ILLNESS:  Ben Lai Sr is a 70 y.o. male who  was diagnosed with hepatocellular carcinoma in November 2018.  He has an underlying hepatitis C which was supposedly cured.    Due to the extent of his HCC per GI at [facility] it was not amenable to local therapy, resection or radiation, referred to medical oncology   He was started on sorafenib early December 2018 but this appears to have been stopped in February due to significant side effects.  Patient states unequivocally that he started to feel better after a 3-week break but the sorafenib was recently restarted and he is having significant quality of life compromising side effects again.  This includes severe joint and muscle pain and fatigue.     7/29/19 restaging scans showed progressive disease in the liver, however no sings of metastatic disease     8/14/19 s/p   CT-guided  right lobe of the liver radiofrequency ablation.     9/2019 seen hepatobiliary team at [facility], and was discussed for tumor board, planned to have Y90  Treatment     10/8/19 MRI abdomen showed Nodular appearing liver with 2 areas of hyper/early arterial enhancement suggestive of hepatocellular carcinoma are both adjacent to the gallbladder which contains filling defects which are enhancing suggestive of neoplasm. This could be primary cholangiocarcinoma or hepatocellular carcinoma which is invading the gallbladder. The liver and gallbladder masses are very similar to the study from 7/29/2019.  Wedge-shaped signal abnormality in the anterior right lobe of the liver similar to the prior study could represent hepatic infarct.    11/7/19 s/p Y90 treatment at [facility]     12/9/19 MRI abdomen showed Cirrhosis and evidence of portal hypertension. Posttreatment changes of radioembolization throughout the right hepatic lobe, with increased diffuse heterogeneous arterial enhancement. This is the  patient's first posttreatment MRI. LR-TR equivocal. Continued attention on follow-up.  Grossly stable size of the lobulated enhancing endoluminal gallbladder mass with decreased but persistent areas of enhancing viable tumor.  Stable segment 4 exophytic 2.6 cm OPTN-5b lesion.  Unchanged right hepatic vein and right portal vein branch thrombi, without convincing thrombus enhancement.  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 Attention on follow-up.    12/11/19 s/p chemo embolization for possible residual viable tumor in the liver.    1/16/20  restaging MR showed  The circumscribed mass is seen in the anterior aspect of the right lobe of the liver demonstrates positive response to treatment with significantly reduced enhancement, no change in overall size.2.  Post radiation ablation defect in the anterior inferior right lobe with surrounding heterogeneous nonmass-like enhancement. It would be difficult to exclude active tumor within this surrounding area of enhancement.  Significant change in appearance of the posterior segment of the right lobe of the liver. Previous infiltrative nodular enhancement, now geographic nonmass-like enhancement with parenchymal volume loss. This could represent posttreatment related change. Difficult to exclude residual/progressive tumor    4/21/20 MR abdomen showed Continued evolution of post embolic/radiotherapy changes to the right hepatic lobe with large region of volume loss of enhancement likely representing fibrosis.  Previous HCC anteriorly along this region continues to regress with no evidence of enhancement to suggest recurrent or residual tumor. Previous nodular lesion inferiorly along the treatment bed which also likely represented a site of HCC also continues to regress with no evidence of recurrence or residual disease. No new suspicious liver lesions are demonstrated. Cirrhosis. Splenomegaly. Portosystemic collaterals.    6/26/20 MR No significant  MRI change in the treatment-related appearance of the right hepatic lobe since 4/21/2020. No MR evidence of recurrent hepatoma. Treated anterior right hepatic lobe lesion is redemonstrated. Treated lesion at right more posterior hepatic lobe is also seen.   Hepatic fibrosis.Cirrhosis, splenomegaly and portosystemic collaterals.    10/5/20 MR abdomen showed 2 treated right hepatic lobe lesions and associated scarring and capsular retraction. Since the previous examination, there is a new 4 mm enhancing nodule at the posterior aspect of the right anterior hepatic lobe treated lesion.  The enhancement characteristics are progressive and indeterminate and there is the hint of small restricted diffusion. Overall, a change in the appearance of this treated lesion which has otherwise been stable since 1/16/2020 is suspicious for recurrent tumor. However, the imaging characteristics themselves are nonspecific of the 4 mm nodule (with differential diagnosis including rounded scar), and attention on short-term follow-up MRI of the abdomen with IV contrast in 3 months is recommended, along with correlation with AFP levels.            Today:   Has had worsening abdominal pain since his ERCP with stenting procedure   He feels well      Energy is  Improving ECOG 2   appetite ist stable, weight is stable   Takes care of his ADLS   Stable,back pain , chest pain and neuropathy         Encounter Diagnosis   Name Primary?   • Hepatocellular carcinoma (CMS/HCC) (HCC)    .     Treatment History:  Oncology History   Hepatocellular carcinoma (CMS/HCC) (HCC)   1/23/2019 Initial Diagnosis    Hepatocellular carcinoma (CMS/HCC) (HCC)     7/30/2019 - 12/9/2019 Adjuvant Therapy    Hepatocellular - Sorafenib (Nexavar)  Plan Provider: ANDERSON Bryan  Treatment goal: Palliative  Line of treatment: [No plan line of treatment]     6/12/2020 Cancer Staged    Staging form: Liver, AJCC 8th Edition  - Clinical: Stage IIIB (cT4, cN0, cM0) -  Signed by Milka Reynoso MD on 6/12/2020               RECENT EVENTS:    PAST MEDICAL HISTORY:   Past Medical History:   Diagnosis Date   • A-fib (CMS/HCC) (HCC)    • Allergy    • Anxiety    • Arthritis    • Asthma    • Blindness of right eye    • BPH (benign prostatic hyperplasia)    • Cardiovascular disease    • Cirrhosis (CMS/HCC) (HCC)     with possible liver mass by MRI 5/18, rec repeat in 8/2018   • Complication of anesthesia    • COPD (chronic obstructive pulmonary disease) (CMS/HCC) (HCC)    • Depression    • Endocrine disorder    • Gallstones    • Gastritis    • GERD (gastroesophageal reflux disease)    • Glaucoma    • Hearing loss     bilateral hearing aids   • Hemorrhoids    • Hepatitis C     Hep C, 2016 remission, complicated by cirrhosis.  MRI abd 5/30/18, rec 3 month follow up for focus of enhancement right and left hepatic lobe junction   • Hernia, abdominal    • High blood pressure    • IBS (irritable bowel syndrome)    • Infectious viral hepatitis    • Jaundice    • Kidney stones    • Obstructive sleep apnea syndrome    • Periodic limb movement disorder    • Persistent hypersomnia    • Persistent insomnia    • Pneumonia    • PONV (postoperative nausea and vomiting)    • Type 2 diabetes mellitus (CMS/HCC) (HCC)    • Urinary incontinence    • Wears partial dentures         GYNECOLOGICAL HISTORY (if applicable): NA     FAMILY HISTORY:   Family History   Problem Relation Age of Onset   • Arthritis Mother    • Rheum arthritis Mother    • Diabetes Mother    • Rectal cancer Mother    • Other Paternal Grandmother    • Other Paternal Grandfather    • Diabetes Other    • Rheum arthritis Other    • Osteoporosis Other         SOCIAL HISTORY:   Social History     Socioeconomic History   • Marital status:      Spouse name: Not on file   • Number of children: Not on file   • Years of education: Not on file   • Highest education level: Not on file   Occupational History   • Not on file   Social Needs    • Financial resource strain: Not on file   • Food insecurity     Worry: Not on file     Inability: Not on file   • Transportation needs     Medical: Not on file     Non-medical: Not on file   Tobacco Use   • Smoking status: Never Smoker   • Smokeless tobacco: Never Used   Substance and Sexual Activity   • Alcohol use: No   • Drug use: No   • Sexual activity: Defer   Lifestyle   • Physical activity     Days per week: Not on file     Minutes per session: Not on file   • Stress: Not on file   Relationships   • Social connections     Talks on phone: Not on file     Gets together: Not on file     Attends [employer] service: Not on file     Active member of club or organization: Not on file     Attends meetings of clubs or organizations: Not on file     Relationship status: Not on file   • Intimate partner violence     Fear of current or ex partner: Not on file     Emotionally abused: Not on file     Physically abused: Not on file     Forced sexual activity: Not on file   Other Topics Concern   • Not on file   Social History Narrative   • Not on file        ALLERGIES:   Allergies as of 10/07/2020 - Reviewed 10/07/2020   Allergen Reaction Noted   • Penicillins Anaphylaxis 10/19/2017   • Metformin Hives    • Adhesive tape-silicones  04/06/2018   • Interferon violetta-2a  02/05/2019   • Latex     • Milk  02/05/2019   • Mometasone     • Mometasone furoate     • Omeprazole     • Ondansetron hcl Nausea And Vomiting 05/01/2019   • Pepper (genus capsicum)  02/05/2019   • Primidone  05/01/2019   • Ribavirin Itching 10/19/2017   • Rosuvastatin  02/05/2019   • Sorafenib  04/04/2019   • Spironolactone     • Statins-hmg-coa reductase inhibitors Itching and GI intolerance 04/06/2018   • Interferon violetta (human leuk. derived) Palpitations and Rash 09/20/2019   • Pantoprazole Itching and Rash 02/28/2018   • Peginterferon violetta-2b Palpitations 10/19/2017        MEDICATIONS:   Current Outpatient Medications   Medication Sig Dispense Refill   •  artificial saliva (YERBA GLADYS AND LYTES) aerosol,spray spray See administration instructions Take 3 to 5 sprays up to 3 to 5 times daily     • lidocaine (Lidoderm) 5 % patch Apply 1 patch topically daily Remove & discard patch within 12 hours or as directed by MD.     • psyllium husk (METAMUCIL ORAL) Take 1 Scoop by mouth daily       • hypromellose (SYSTANE GEL OPHT) Administer into affected eye(s) as needed     • fluticasone propionate (FLONASE) 50 mcg/actuation nasal spray Administer 2 sprays into each nostril daily       • CLOTRIMAZOLE, BULK, MISC as needed To feet     • naloxone HCl (NARCAN NASL) Administer into affected nostril(s) as needed     • HYDROmorphone (DILAUDID) 2 mg tablet Take 2 mg by mouth every 4 (four) hours as needed     • sildenafiL (VIAGRA) 100 mg tablet Take 100 mg by mouth daily as needed for erectile dysfunction     • traZODone (DESYREL) 100 mg tablet Take 100 mg by mouth nightly       • ondansetron (ZOFRAN) 4 mg tablet Take 4 mg by mouth every 8 (eight) hours as needed for nausea or vomiting     • SUMAtriptan (IMITREX) 100 mg tablet Take 100 mg by mouth once as needed for migraine     • insulin glargine (Lantus Solostar U-100 Insulin) 100 unit/mL (3 mL) insulin pen injection pen Inject 10 Units under the skin every morning     • miconazole nitrate (SECURA ANTIFUNGAL T) Apply topically as needed       • carvediloL (COREG) 3.125 mg tablet Take 1 tablet (3.125 mg total) by mouth 2 (two) times a day with meals 180 tablet 0   • diclofenac sodium (VOLTAREN) 1 % gel Apply 4 g topically 4 (four) times a day       • HYDROmorphone (Dilaudid) 4 mg tablet Take 1 tablet (4 mg total) by mouth every 4 (four) hours as needed (pain) Max Daily Amount: 24 mg 180 tablet 0   • cetirizine (ZyrTEC) 10 mg tablet Take 10 mg by mouth daily     • loperamide (Imodium A-D) 2 mg tablet Take 2 tablets with first loose stool of the day, then up to 8 tablets daily 80 tablet 2   • hydrOXYzine (ATARAX) 25 mg tablet Take 1  tablet (25 mg total) by mouth nightly 90 tablet 2   • buPROPion SR (WELLBUTRIN SR) 100 mg 12 hr tablet Take 1 tablet (100 mg total) by mouth daily 90 tablet 1   • lactulose (GENERLAC) 10 gram/15 mL solution Take 15 mL (10 g total) by mouth daily 473 mL 2   • terazosin (HYTRIN) 5 mg capsule Take 1 capsule (5 mg total) by mouth 2 (two) times a day 180 capsule 2   • blood-glucose meter (ACCU-CHEK ADELE PLUS METER) [facility] Use to test blood sugars 3 times daily (E11.65, non insulin depedent) AccuChek Adele plus, meter is 8 years old (Patient taking differently: Use to test blood sugars 3 times daily (E11.65, non insulin dependent) ) 1 each 0   • lisinopril (PRINIVIL,ZESTRIL) 20 mg tablet Take 1 tablet (20 mg total) by mouth daily 90 tablet 2   • furosemide (LASIX) 20 mg tablet Take 1 tablet (20 mg total) by mouth 2 (two) times a day. 180 tablet 2   • lansoprazole (PREVACID) 30 mg capsule Take 30 mg by mouth daily      • liraglutide (VICTOZA 2-JASE) 0.6 mg/0.1 mL (18 mg/3 mL) injection Inject 1.8 mg under the skin daily       • capsaicin (ZOSTRIX) 0.025 % cream Apply 1 application topically as needed for pain scale 1-3/10, 1-3/8.     • sodium chloride (SALINE NASAL) 0.65 % nasal spray Administer 1 spray into each nostril as needed for congestion or rhinitis      • white petrolatum (AQUAPHOR HEALING) 41 % ointment ointment Apply 1 application topically as needed.     • pramoxine-menthol (GOLD BOND MEDICATED ANTI-ITCH) 1-1 % cream Apply topically as needed       • GLYCERIN/PROPYLENE GLYCOL (ARTIFICIAL TEARS,GLYCERIN-PEG, OPHT) Administer 1 drop into affected eye(s) as needed (Dry eyes)      • latanoprost (XALATAN) 0.005 % ophthalmic solution Administer 1 drop into both eyes nightly   10 0     No current facility-administered medications for this visit.        REVIEW OF SYSTEMS:   Review of Systems   Constitutional: Positive for fatigue.   HENT:   Positive for hearing loss.    Eyes: Positive for eye problems.    Gastrointestinal: Positive for abdominal distention and abdominal pain.   Endocrine: Negative.    Genitourinary: Positive for bladder incontinence.    Musculoskeletal: Positive for arthralgias, gait problem and myalgias.   Skin: Negative.    Neurological: Positive for gait problem.   Hematological: Negative.    Psychiatric/Behavioral: The patient is nervous/anxious.    All other systems reviewed and are negative.      The ECOG performance status today is 2 - Ambulatory care of self; up and about >50% of waking hours.          Objective    PHYSICAL EXAM:   /70   Pulse 72   Temp 37.1 °C (98.8 °F) (Oral)   Resp 16   Wt 95.6 kg (210 lb 12.8 oz)   SpO2 95%   BMI 36.18 kg/m²    Body mass index is 36.18 kg/m².       Physical Exam  HENT:      Head: Normocephalic.      Nose: Nose normal.      Mouth/Throat:      Mouth: Mucous membranes are moist.   Eyes:      Pupils: Pupils are equal, round, and reactive to light.   Neck:      Musculoskeletal: Normal range of motion.   Cardiovascular:      Rate and Rhythm: Normal rate.   Pulmonary:      Effort: Pulmonary effort is normal.   Abdominal:      General: Abdomen is flat. There is no distension.   Musculoskeletal: Normal range of motion.   Skin:     General: Skin is dry.   Neurological:      General: No focal deficit present.      Mental Status: He is alert.   Psychiatric:         Mood and Affect: Mood normal.         LABS:   Lab on 10/05/2020   Component Date Value Ref Range Status   • WBC 10/05/2020 3.2* 3.7 - 9.6 10*3/uL Final   • RBC 10/05/2020 4.22  4.10 - 5.80 10*6/µL Final   • Hemoglobin 10/05/2020 12.4* 13.2 - 17.2 g/dL Final   • Hematocrit 10/05/2020 37.3* 38.0 - 50.0 % Final   • MCV 10/05/2020 88.3  82.0 - 97.0 fL Final   • MCH 10/05/2020 29.5  29.0 - 34.0 pg Final   • MCHC 10/05/2020 33.4  32.0 - 36.0 g/dL Final   • RDW 10/05/2020 13.3  11.5 - 15.0 % Final   • Platelets 10/05/2020 108* 130 - 350 10*3/uL Final   • MPV 10/05/2020 7.9  6.9 - 10.8 fL Final   •  Neutrophils% 10/05/2020 75* 46 - 70 % Final   • Lymphocytes% 10/05/2020 13* 15 - 47 % Final   • Monocytes% 10/05/2020 9  5 - 13 % Final   • Eosinophils% 10/05/2020 2  0 - 3 % Final   • Basophils% 10/05/2020 1  0 - 2 % Final   • Neutrophils Absolute 10/05/2020 2.40  10*3/uL Final   • Lymphocytes Absolute 10/05/2020 0.40  10*3/uL Final   • Monocytes Absolute 10/05/2020 0.30  10*3/uL Final   • Eosinophils Absolute 10/05/2020 0.10  10*3/uL Final   • Basophils Absolute 10/05/2020 0.00  10*3/uL Final   • Sodium 10/05/2020 141  135 - 145 mmol/L Final   • Potassium 10/05/2020 4.3  3.5 - 5.1 mmol/L Final   • Chloride 10/05/2020 109* 98 - 107 mmol/L Final   • CO2 10/05/2020 24  21 - 32 mmol/L Final   • Anion Gap 10/05/2020 8  3 - 11 mmol/L Final   • BUN 10/05/2020 16  7 - 25 mg/dL Final   • Creatinine 10/05/2020 0.79  0.70 - 1.30 mg/dL Final   • Glucose 10/05/2020 181* 70 - 105 mg/dL Final   • Calcium 10/05/2020 9.1  8.6 - 10.3 mg/dL Final   • AST 10/05/2020 28  <40 U/L Final   • ALT (SGPT) 10/05/2020 23  7 - 52 U/L Final   • Alkaline Phosphatase 10/05/2020 128* 45 - 115 U/L Final   • Total Protein 10/05/2020 6.1  6.0 - 8.3 g/dL Final   • Albumin 10/05/2020 3.8  3.5 - 5.3 g/dL Final   • Total Bilirubin 10/05/2020 0.76  0.20 - 1.40 mg/dL Final   • eGFR 10/05/2020 92  >60 mL/min/1.73m*2 Final   • Corrected Calcium 10/05/2020 9.3  8.6 - 10.3 mg/dL Final   • Alpha-Fetoprotein 10/05/2020 15* <=9 ng/mL Final       DIAGNOSTIC REPORTS REVIEWED:   Imaging:  Reviewed     Assessment/Plan    IMPRESSION:   This is a pleasant 69-year-old male with diagnosis of hepatocellular carcinoma involving the right lobe, locally advanced disease, per the chart not amenable to local therapy, started sorafenib December 2018 however stopped in February 2019 due to intolerance without any dose reductions.  Has been off of treatment since then, restaging CAT scan and MRI  7/2019 show progressive disease in the liver.  Started  sorafenib 8/2019 at a lower  dose 200 mg twice daily, s/p RFA 8/2019, was referred to [facility], s/p Y90 treatment followed by TACE, MR showed response to treatment without progression on 1/16/20 and 4/21/20  currently treatment on hold until evidence of disease progression      It seems like vascular surgery at  were concerned about enhancement of the gallbladder and they wanted to discuss at tumor board and inform me of their decision. There is no rule for surgery, and they discussed possibly starting atezolizumab and bevacizumab, however I dont agree given no evidence of disease progression, and normal AFP follow up MR with stable disease per RECIST criteria. New 4 mm questionable lesion in the treated lesion recurrence vs. Scar however AFP stable        Plan:   - no need for systemic treatment at this point given stable disease, when he progresses will discuss sorafenib vs. lenvatinib vs. atezolizomab and bevacizumab   -  Follow up with [facility] for GI follow up and repeat ERCP   - continue follow up with palliative medicine for pain control.   - Follow-up in 2-3 months with  labs and restaging MR to rule out progression.               Physician: Milka Reynoso MD    I spent 40 minutes with the patient today with greater than 50% in counseling and/or coordination of care     Breath sounds clear and equal bilaterally.

## 2021-03-26 NOTE — ED ADULT TRIAGE NOTE - AS O2 DELIVERY
room air Elidel Counseling: Patient may experience a mild burning sensation during topical application. Elidel is not approved in children less than 2 years of age. There have been case reports of hematologic and skin malignancies in patients using topical calcineurin inhibitors although causality is questionable.
